# Patient Record
Sex: MALE | Race: WHITE
[De-identification: names, ages, dates, MRNs, and addresses within clinical notes are randomized per-mention and may not be internally consistent; named-entity substitution may affect disease eponyms.]

---

## 2017-02-01 ENCOUNTER — HOSPITAL ENCOUNTER (EMERGENCY)
Dept: HOSPITAL 47 - EC | Age: 27
Discharge: HOME | End: 2017-02-01
Payer: COMMERCIAL

## 2017-02-01 VITALS — TEMPERATURE: 98.6 F | DIASTOLIC BLOOD PRESSURE: 70 MMHG | HEART RATE: 91 BPM | SYSTOLIC BLOOD PRESSURE: 136 MMHG

## 2017-02-01 VITALS — RESPIRATION RATE: 18 BRPM

## 2017-02-01 DIAGNOSIS — M94.0: Primary | ICD-10-CM

## 2017-02-01 DIAGNOSIS — F32.9: ICD-10-CM

## 2017-02-01 DIAGNOSIS — Z88.0: ICD-10-CM

## 2017-02-01 DIAGNOSIS — Z79.899: ICD-10-CM

## 2017-02-01 LAB
ALP SERPL-CCNC: 91 U/L (ref 38–126)
ALT SERPL-CCNC: 39 U/L (ref 21–72)
AMYLASE SERPL-CCNC: 88 U/L (ref 30–110)
ANION GAP SERPL CALC-SCNC: 11 MMOL/L
AST SERPL-CCNC: 25 U/L (ref 17–59)
BASOPHILS # BLD AUTO: 0.1 K/UL (ref 0–0.2)
BASOPHILS NFR BLD AUTO: 1 %
BUN SERPL-SCNC: 9 MG/DL (ref 9–20)
CALCIUM SPEC-MCNC: 9.6 MG/DL (ref 8.4–10.2)
CH: 30.8
CHCM: 34.9
CHLORIDE SERPL-SCNC: 104 MMOL/L (ref 98–107)
CO2 SERPL-SCNC: 24 MMOL/L (ref 22–30)
EOSINOPHIL # BLD AUTO: 0.1 K/UL (ref 0–0.7)
EOSINOPHIL NFR BLD AUTO: 2 %
ERYTHROCYTE [DISTWIDTH] IN BLOOD BY AUTOMATED COUNT: 5.42 M/UL (ref 4.3–5.9)
ERYTHROCYTE [DISTWIDTH] IN BLOOD: 13 % (ref 11.5–15.5)
GLUCOSE SERPL-MCNC: 112 MG/DL (ref 74–99)
HCT VFR BLD AUTO: 47.9 % (ref 39–53)
HDW: 2.52
HGB BLD-MCNC: 16.3 GM/DL (ref 13–17.5)
LUC NFR BLD AUTO: 1 %
LYMPHOCYTES # SPEC AUTO: 1.6 K/UL (ref 1–4.8)
LYMPHOCYTES NFR SPEC AUTO: 25 %
MCH RBC QN AUTO: 30.2 PG (ref 25–35)
MCHC RBC AUTO-ENTMCNC: 34.1 G/DL (ref 31–37)
MCV RBC AUTO: 88.4 FL (ref 80–100)
MONOCYTES # BLD AUTO: 0.3 K/UL (ref 0–1)
MONOCYTES NFR BLD AUTO: 5 %
NEUTROPHILS # BLD AUTO: 4.3 K/UL (ref 1.3–7.7)
NEUTROPHILS NFR BLD AUTO: 66 %
NON-AFRICAN AMERICAN GFR(MDRD): >60
PARTICLE COUNT: 1971
PH UR: 6.5 [PH] (ref 5–8)
POTASSIUM SERPL-SCNC: 4.3 MMOL/L (ref 3.5–5.1)
PROT SERPL-MCNC: 7.2 G/DL (ref 6.3–8.2)
RBC UR QL: <1 /HPF (ref 0–5)
SODIUM SERPL-SCNC: 139 MMOL/L (ref 137–145)
SP GR UR: 1.01 (ref 1–1.03)
UA BILLING (MACRO VS. MICRO): (no result)
UROBILINOGEN UR QL STRIP: 2 MG/DL (ref ?–2)
WBC # BLD AUTO: 0.09 10*3/UL
WBC # BLD AUTO: 6.5 K/UL (ref 3.8–10.6)
WBC #/AREA URNS HPF: 1 /HPF (ref 0–5)
WBC (PEROX): 6.61

## 2017-02-01 PROCEDURE — 86308 HETEROPHILE ANTIBODY SCREEN: CPT

## 2017-02-01 PROCEDURE — 36415 COLL VENOUS BLD VENIPUNCTURE: CPT

## 2017-02-01 PROCEDURE — 83690 ASSAY OF LIPASE: CPT

## 2017-02-01 PROCEDURE — 99284 EMERGENCY DEPT VISIT MOD MDM: CPT

## 2017-02-01 PROCEDURE — 74000: CPT

## 2017-02-01 PROCEDURE — 71020: CPT

## 2017-02-01 PROCEDURE — 82150 ASSAY OF AMYLASE: CPT

## 2017-02-01 PROCEDURE — 80053 COMPREHEN METABOLIC PANEL: CPT

## 2017-02-01 PROCEDURE — 81001 URINALYSIS AUTO W/SCOPE: CPT

## 2017-02-01 PROCEDURE — 85025 COMPLETE CBC W/AUTO DIFF WBC: CPT

## 2017-02-01 NOTE — XR
EXAMINATION TYPE: XR KUB

 

DATE OF EXAM: 2/1/2017 10:00 PM

 

COMPARISON: NONE

 

HISTORY: Left-sided chest pain and abdominal pain

 

TECHNIQUE: 2 views

 

FINDINGS: Bowel gas pattern is normal. There is no sign of intestinal obstruction or pneumoperitoneum
. Fecal pattern is normal. Lung bases are clear. There are no pathologic calcifications over the kidn
eys.

 

IMPRESSION: Nonacute abdomen.

## 2017-02-01 NOTE — XR
EXAMINATION TYPE: XR chest 2V

 

DATE OF EXAM: 2/1/2017 9:59 PM

 

COMPARISON: NONE

 

HISTORY: Chest pain

 

TECHNIQUE:  Frontal and lateral views of the chest are obtained.

 

FINDINGS:  Heart and mediastinum are normal. Lungs are clear. Diaphragm is normal. Bony thorax and so
ft tissues appear normal.

 

IMPRESSION:  Normal chest

## 2017-02-01 NOTE — ED
Abdominal Pain HPI





- General


Chief Complaint: Abdominal Pain


Stated Complaint: rib pain


Time Seen by Provider: 02/01/17 21:30


Source: patient, RN notes reviewed


Mode of arrival: ambulatory


Limitations: no limitations





- History of Present Illness


Initial Comments: 





26-year-old male presents emergency Department chief complaint left-sided chest

, abdominal pain.  Patient states that it started when he woke this morning.  

Patient states nothing makes it feel better or worse.  Patient denies nausea, 

vomiting, diarrhea constipation.  Patient denies any chest pain or shortness of 

breath.  Patient states she had no trauma denies any rashes area.  Patient has 

not taken any Tylenol Motrin today.





- Related Data


 Home Medications











 Medication  Instructions  Recorded  Confirmed


 


Buspar(Unknown) 1 tab PO QAM 02/01/17 02/01/17


 


Lamictal(Unknown) 1 tab PO QAM 02/01/17 02/01/17


 


Prozac(Unknown) 1 cap PO QAM 02/01/17 02/01/17











 Allergies











Allergy/AdvReac Type Severity Reaction Status Date / Time


 


Penicillins Allergy  Rash/Hives Verified 02/01/17 21:35














Review of Systems


ROS Statement: 


Those systems with pertinent positive or pertinent negative responses have been 

documented in the HPI.





ROS Other: All systems not noted in ROS Statement are negative.





Past Medical History


Past Medical History: No Reported History


History of Any Multi-Drug Resistant Organisms: None Reported


Additional Past Surgical History / Comment(s): right acl, right knee arthroscopy

, right meniscus tear, right knee I&D


Past Psychological History: Depression


Smoking Status: Never smoker


Past Alcohol Use History: Occasional


Past Drug Use History: None Reported





General Exam


Limitations: no limitations


General appearance: alert, in no apparent distress


Head exam: Present: atraumatic, normocephalic, normal inspection


ENT exam: Present: normal exam, normal oropharynx, mucous membranes moist


Neck exam: Present: normal inspection, full ROM.  Absent: tenderness, 

meningismus, lymphadenopathy


Respiratory exam: Present: normal lung sounds bilaterally, chest wall 

tenderness (Minimal tenderness left lower ribs).  Absent: respiratory distress, 

wheezes, rales, rhonchi, stridor


Cardiovascular Exam: Present: regular rate, normal rhythm, normal heart sounds.

  Absent: systolic murmur, diastolic murmur, rubs, gallop, clicks


GI/Abdominal exam: Present: soft, normal bowel sounds, other (No splenomegaly 

appreciated).  Absent: distended, tenderness, guarding, rebound, rigid


Back exam: Absent: CVA tenderness (R), CVA tenderness (L)


Neurological exam: Present: alert, oriented X3, CN II-XII intact


Skin exam: Present: warm, dry, intact, normal color.  Absent: rash





Course


 Vital Signs











  02/01/17





  21:27


 


Temperature 98.1 F


 


Pulse Rate 70


 


Respiratory 18





Rate 


 


Blood Pressure 137/63


 


O2 Sat by Pulse 98





Oximetry 














Medical Decision Making





- Medical Decision Making





26-year-old male present emergency department for left rib/abdominal pain.  

Patient had no trauma rashes.  Patient lab work, x-rays within normal limits.  

Pain is reproducible in between the past.  This may be consistent with 

costochondritis.  Patient will follow-up with primary care physician.  We 

discussed taking ibuprofen for the pain





- Lab Data


Result diagrams: 


 02/01/17 21:55





 02/01/17 21:55


 Lab Results











  02/01/17 02/01/17 02/01/17 Range/Units





  21:55 21:55 21:55 


 


WBC   6.5   (3.8-10.6)  k/uL


 


RBC   5.42   (4.30-5.90)  m/uL


 


Hgb   16.3   (13.0-17.5)  gm/dL


 


Hct   47.9   (39.0-53.0)  %


 


MCV   88.4   (80.0-100.0)  fL


 


MCH   30.2   (25.0-35.0)  pg


 


MCHC   34.1   (31.0-37.0)  g/dL


 


RDW   13.0   (11.5-15.5)  %


 


Plt Count   231   (150-450)  k/uL


 


Neutrophils %   66   %


 


Lymphocytes %   25   %


 


Monocytes %   5   %


 


Eosinophils %   2   %


 


Basophils %   1   %


 


Neutrophils #   4.3   (1.3-7.7)  k/uL


 


Lymphocytes #   1.6   (1.0-4.8)  k/uL


 


Monocytes #   0.3   (0-1.0)  k/uL


 


Eosinophils #   0.1   (0-0.7)  k/uL


 


Basophils #   0.1   (0-0.2)  k/uL


 


Sodium  139    (137-145)  mmol/L


 


Potassium  4.3    (3.5-5.1)  mmol/L


 


Chloride  104    ()  mmol/L


 


Carbon Dioxide  24    (22-30)  mmol/L


 


Anion Gap  11    mmol/L


 


BUN  9    (9-20)  mg/dL


 


Creatinine  1.00    (0.66-1.25)  mg/dL


 


Est GFR (MDRD) Af Amer  >60    (>60 ml/min/1.73 sqM)  


 


Est GFR (MDRD) Non-Af  >60    (>60 ml/min/1.73 sqM)  


 


Glucose  112 H    (74-99)  mg/dL


 


Calcium  9.6    (8.4-10.2)  mg/dL


 


Total Bilirubin  0.8    (0.2-1.3)  mg/dL


 


AST  25    (17-59)  U/L


 


ALT  39    (21-72)  U/L


 


Alkaline Phosphatase  91    ()  U/L


 


Total Protein  7.2    (6.3-8.2)  g/dL


 


Albumin  4.4    (3.5-5.0)  g/dL


 


Amylase  88    ()  U/L


 


Lipase  84    ()  U/L


 


Urine Color     


 


Urine Appearance     (Clear)  


 


Urine pH     (5.0-8.0)  


 


Ur Specific Gravity     (1.001-1.035)  


 


Urine Protein     (Negative)  


 


Urine Glucose (UA)     (Negative)  


 


Urine Ketones     (Negative)  


 


Urine Blood     (Negative)  


 


Urine Nitrate     (Negative)  


 


Urine Bilirubin     (Negative)  


 


Urine Urobilinogen     (<2.0)  mg/dL


 


Ur Leukocyte Esterase     (Negative)  


 


Urine RBC     (0-5)  /hpf


 


Urine WBC     (0-5)  /hpf


 


Urine Mucus     (None)  /hpf


 


Heterophile Antibody    Negative  (Negative)  














  02/01/17 Range/Units





  22:25 


 


WBC   (3.8-10.6)  k/uL


 


RBC   (4.30-5.90)  m/uL


 


Hgb   (13.0-17.5)  gm/dL


 


Hct   (39.0-53.0)  %


 


MCV   (80.0-100.0)  fL


 


MCH   (25.0-35.0)  pg


 


MCHC   (31.0-37.0)  g/dL


 


RDW   (11.5-15.5)  %


 


Plt Count   (150-450)  k/uL


 


Neutrophils %   %


 


Lymphocytes %   %


 


Monocytes %   %


 


Eosinophils %   %


 


Basophils %   %


 


Neutrophils #   (1.3-7.7)  k/uL


 


Lymphocytes #   (1.0-4.8)  k/uL


 


Monocytes #   (0-1.0)  k/uL


 


Eosinophils #   (0-0.7)  k/uL


 


Basophils #   (0-0.2)  k/uL


 


Sodium   (137-145)  mmol/L


 


Potassium   (3.5-5.1)  mmol/L


 


Chloride   ()  mmol/L


 


Carbon Dioxide   (22-30)  mmol/L


 


Anion Gap   mmol/L


 


BUN   (9-20)  mg/dL


 


Creatinine   (0.66-1.25)  mg/dL


 


Est GFR (MDRD) Af Amer   (>60 ml/min/1.73 sqM)  


 


Est GFR (MDRD) Non-Af   (>60 ml/min/1.73 sqM)  


 


Glucose   (74-99)  mg/dL


 


Calcium   (8.4-10.2)  mg/dL


 


Total Bilirubin   (0.2-1.3)  mg/dL


 


AST   (17-59)  U/L


 


ALT   (21-72)  U/L


 


Alkaline Phosphatase   ()  U/L


 


Total Protein   (6.3-8.2)  g/dL


 


Albumin   (3.5-5.0)  g/dL


 


Amylase   ()  U/L


 


Lipase   ()  U/L


 


Urine Color  Yellow  


 


Urine Appearance  Clear  (Clear)  


 


Urine pH  6.5  (5.0-8.0)  


 


Ur Specific Gravity  1.011  (1.001-1.035)  


 


Urine Protein  Negative  (Negative)  


 


Urine Glucose (UA)  Negative  (Negative)  


 


Urine Ketones  Negative  (Negative)  


 


Urine Blood  Negative  (Negative)  


 


Urine Nitrate  Negative  (Negative)  


 


Urine Bilirubin  Negative  (Negative)  


 


Urine Urobilinogen  2.0  (<2.0)  mg/dL


 


Ur Leukocyte Esterase  Trace H  (Negative)  


 


Urine RBC  <1  (0-5)  /hpf


 


Urine WBC  1  (0-5)  /hpf


 


Urine Mucus  Rare H  (None)  /hpf


 


Heterophile Antibody   (Negative)  














Disposition


Clinical Impression: 


 Acute costochondritis





Disposition: HOME SELF-CARE


Condition: Stable


Instructions:  Costochondritis (ED)


Additional Instructions: 


Please return to the Emergency Department if symptoms worsen or any other 

concerns.


Time of Disposition: 23:06

## 2017-02-08 ENCOUNTER — HOSPITAL ENCOUNTER (EMERGENCY)
Dept: HOSPITAL 47 - EC | Age: 27
Discharge: HOME | End: 2017-02-08
Payer: COMMERCIAL

## 2017-02-08 VITALS — SYSTOLIC BLOOD PRESSURE: 141 MMHG | HEART RATE: 77 BPM | DIASTOLIC BLOOD PRESSURE: 62 MMHG

## 2017-02-08 VITALS — TEMPERATURE: 98.1 F

## 2017-02-08 VITALS — RESPIRATION RATE: 16 BRPM

## 2017-02-08 DIAGNOSIS — Z79.899: ICD-10-CM

## 2017-02-08 DIAGNOSIS — F41.8: ICD-10-CM

## 2017-02-08 DIAGNOSIS — R10.13: ICD-10-CM

## 2017-02-08 DIAGNOSIS — D72.829: ICD-10-CM

## 2017-02-08 DIAGNOSIS — K92.0: Primary | ICD-10-CM

## 2017-02-08 DIAGNOSIS — Z88.0: ICD-10-CM

## 2017-02-08 LAB
ALP SERPL-CCNC: 105 U/L (ref 38–126)
ALT SERPL-CCNC: 32 U/L (ref 21–72)
AMYLASE SERPL-CCNC: 83 U/L (ref 30–110)
ANION GAP SERPL CALC-SCNC: 17 MMOL/L
AST SERPL-CCNC: 26 U/L (ref 17–59)
BASOPHILS # BLD AUTO: 0.1 K/UL (ref 0–0.2)
BASOPHILS NFR BLD AUTO: 0 %
BUN SERPL-SCNC: 10 MG/DL (ref 9–20)
CALCIUM SPEC-MCNC: 10 MG/DL (ref 8.4–10.2)
CH: 30.8
CHCM: 35.2
CHLORIDE SERPL-SCNC: 105 MMOL/L (ref 98–107)
CO2 SERPL-SCNC: 22 MMOL/L (ref 22–30)
EOSINOPHIL # BLD AUTO: 0 K/UL (ref 0–0.7)
EOSINOPHIL NFR BLD AUTO: 0 %
ERYTHROCYTE [DISTWIDTH] IN BLOOD BY AUTOMATED COUNT: 5.39 M/UL (ref 4.3–5.9)
ERYTHROCYTE [DISTWIDTH] IN BLOOD: 13 % (ref 11.5–15.5)
GLUCOSE SERPL-MCNC: 126 MG/DL (ref 74–99)
HCT VFR BLD AUTO: 47.4 % (ref 39–53)
HDW: 2.49
HGB BLD-MCNC: 15.8 GM/DL (ref 13–17.5)
KETONES UR QL STRIP.AUTO: (no result)
LUC NFR BLD AUTO: 0 %
LYMPHOCYTES # SPEC AUTO: 1 K/UL (ref 1–4.8)
LYMPHOCYTES NFR SPEC AUTO: 6 %
MCH RBC QN AUTO: 29.3 PG (ref 25–35)
MCHC RBC AUTO-ENTMCNC: 33.3 G/DL (ref 31–37)
MCV RBC AUTO: 87.9 FL (ref 80–100)
MONOCYTES # BLD AUTO: 0.3 K/UL (ref 0–1)
MONOCYTES NFR BLD AUTO: 2 %
NEUTROPHILS # BLD AUTO: 13.5 K/UL (ref 1.3–7.7)
NEUTROPHILS NFR BLD AUTO: 91 %
NON-AFRICAN AMERICAN GFR(MDRD): >60
PARTICLE COUNT: 2627
PH UR: 8 [PH] (ref 5–8)
POTASSIUM SERPL-SCNC: 4.3 MMOL/L (ref 3.5–5.1)
PROT SERPL-MCNC: 7.4 G/DL (ref 6.3–8.2)
PROT UR QL: (no result)
RBC UR QL: 1 /HPF (ref 0–5)
SODIUM SERPL-SCNC: 144 MMOL/L (ref 137–145)
SP GR UR: 1.03 (ref 1–1.03)
SQUAMOUS UR QL AUTO: <1 /HPF (ref 0–4)
UA BILLING (MACRO VS. MICRO): (no result)
UROBILINOGEN UR QL STRIP: <2 MG/DL (ref ?–2)
WBC # BLD AUTO: 0.04 10*3/UL
WBC # BLD AUTO: 14.8 K/UL (ref 3.8–10.6)
WBC #/AREA URNS HPF: 4 /HPF (ref 0–5)
WBC (PEROX): 15.46

## 2017-02-08 PROCEDURE — 99284 EMERGENCY DEPT VISIT MOD MDM: CPT

## 2017-02-08 PROCEDURE — 74000: CPT

## 2017-02-08 PROCEDURE — 96375 TX/PRO/DX INJ NEW DRUG ADDON: CPT

## 2017-02-08 PROCEDURE — 80053 COMPREHEN METABOLIC PANEL: CPT

## 2017-02-08 PROCEDURE — 96361 HYDRATE IV INFUSION ADD-ON: CPT

## 2017-02-08 PROCEDURE — 81001 URINALYSIS AUTO W/SCOPE: CPT

## 2017-02-08 PROCEDURE — 83690 ASSAY OF LIPASE: CPT

## 2017-02-08 PROCEDURE — 96376 TX/PRO/DX INJ SAME DRUG ADON: CPT

## 2017-02-08 PROCEDURE — 85025 COMPLETE CBC W/AUTO DIFF WBC: CPT

## 2017-02-08 PROCEDURE — 74177 CT ABD & PELVIS W/CONTRAST: CPT

## 2017-02-08 PROCEDURE — 36415 COLL VENOUS BLD VENIPUNCTURE: CPT

## 2017-02-08 PROCEDURE — 82150 ASSAY OF AMYLASE: CPT

## 2017-02-08 PROCEDURE — 96374 THER/PROPH/DIAG INJ IV PUSH: CPT

## 2017-02-08 NOTE — ED
Nausea/Vomiting/Diarrhea HPI





- General


Chief complaint: Nausea/Vomiting/Diarrhea


Stated complaint: Vomiting Blood


Time Seen by Provider: 02/08/17 02:02


Source: patient, RN notes reviewed


Mode of arrival: ambulatory


Limitations: no limitations





- History of Present Illness


Initial comments: 





Patient is a 26 year old male with chief complaint of vomiting for 2 hours, and 

patient states he has been vomiting blood. Patient states this has never 

happened before, denies history of reflx disease. Patient reports that he has 

epigastric abdominal pain. Patient reports it is dark brown blood. Patient 

states has no other symptoms including diarrhea, blood stools, or fever or 

chills. Patient denies cough and rib pain. 





- Related Data


 Home Medications











 Medication  Instructions  Recorded  Confirmed


 


Buspar(Unknown) 1 tab PO QAM 02/01/17 02/01/17


 


Lamictal(Unknown) 1 tab PO QAM 02/01/17 02/01/17


 


Prozac(Unknown) 1 cap PO QAM 02/01/17 02/01/17








 Previous Rx's











 Medication  Instructions  Recorded


 


Omeprazole 40 mg PO DAILY #20 capsule. 02/08/17


 


Ondansetron Odt [Zofran Odt] 4 mg PO Q8HR PRN #12 tab 02/08/17











 Allergies











Allergy/AdvReac Type Severity Reaction Status Date / Time


 


Penicillins Allergy  Rash/Hives Verified 02/08/17 01:30














Review of Systems


ROS Statement: 


Those systems with pertinent positive or pertinent negative responses have been 

documented in the HPI.





ROS Other: All systems not noted in ROS Statement are negative.





Past Medical History


Past Medical History: No Reported History


History of Any Multi-Drug Resistant Organisms: None Reported


Additional Past Surgical History / Comment(s): right acl, right knee arthroscopy

, right meniscus tear, right knee I&D


Past Psychological History: Anxiety, Depression


Smoking Status: Never smoker


Past Alcohol Use History: Occasional


Past Drug Use History: None Reported





General Exam





- General Exam Comments


Initial Comments: 





Well appearing 26 year old male, no acute distress. 


Limitations: no limitations


General appearance: alert, in no apparent distress


Head exam: Present: atraumatic, normocephalic, normal inspection


Eye exam: Present: normal appearance, PERRL, EOMI.  Absent: scleral icterus, 

conjunctival injection, periorbital swelling


ENT exam: Present: normal exam, mucous membranes moist


Neck exam: Present: normal inspection.  Absent: tenderness, meningismus, 

lymphadenopathy


Respiratory exam: Present: normal lung sounds bilaterally.  Absent: respiratory 

distress, wheezes, rales, rhonchi, stridor


Cardiovascular Exam: Present: regular rate, normal rhythm, normal heart sounds.

  Absent: systolic murmur, diastolic murmur, rubs, gallop, clicks


GI/Abdominal exam: Present: soft, tenderness (epigastric tenderness), normal 

bowel sounds.  Absent: distended, guarding, rebound, rigid


Extremities exam: Present: normal inspection, full ROM, normal capillary 

refill.  Absent: tenderness, pedal edema, joint swelling, calf tenderness


Back exam: Present: normal inspection


Neurological exam: Present: alert, oriented X3, CN II-XII intact


Psychiatric exam: Present: normal affect, normal mood


Skin exam: Present: warm, dry, intact, normal color.  Absent: rash





Course


 Vital Signs











  02/08/17 02/08/17 02/08/17





  01:28 02:30 04:36


 


Temperature 98.1 F  


 


Pulse Rate 89 79 77


 


Respiratory 18 16 16





Rate   


 


Blood Pressure 129/76 138/69 141/62


 


O2 Sat by Pulse 98 97 98





Oximetry   














Medical Decision Making





- Medical Decision Making





Patientis a 26 year old with 2 hours of bloody emesis. Emesis is dark coffee 

ground like. Patient reports this has never happened before. Patient has 

leukocytosis and continue dnausea despite inital dose of zofran. Patient given 

IV protonix, morphine, and reglan and zofran. Patient reports pain is 

diminshed. Ct is negative for any acute process. Patient will discharged with 

Omeprazole and advised to follow up with GI specialist. REturn parameters 

discussed. Patient also given Rx for nausea medicaiotn and note for work. 





- Lab Data


Result diagrams: 


 02/08/17 01:40





 02/08/17 01:40


 Lab Results











  02/08/17 02/08/17 02/08/17 Range/Units





  01:40 01:40 03:20 


 


WBC   14.8 H   (3.8-10.6)  k/uL


 


RBC   5.39   (4.30-5.90)  m/uL


 


Hgb   15.8   (13.0-17.5)  gm/dL


 


Hct   47.4   (39.0-53.0)  %


 


MCV   87.9   (80.0-100.0)  fL


 


MCH   29.3   (25.0-35.0)  pg


 


MCHC   33.3   (31.0-37.0)  g/dL


 


RDW   13.0   (11.5-15.5)  %


 


Plt Count   256   (150-450)  k/uL


 


Neutrophils %   91   %


 


Lymphocytes %   6   %


 


Monocytes %   2   %


 


Eosinophils %   0   %


 


Basophils %   0   %


 


Neutrophils #   13.5 H   (1.3-7.7)  k/uL


 


Lymphocytes #   1.0   (1.0-4.8)  k/uL


 


Monocytes #   0.3   (0-1.0)  k/uL


 


Eosinophils #   0.0   (0-0.7)  k/uL


 


Basophils #   0.1   (0-0.2)  k/uL


 


Sodium  144    (137-145)  mmol/L


 


Potassium  4.3    (3.5-5.1)  mmol/L


 


Chloride  105    ()  mmol/L


 


Carbon Dioxide  22    (22-30)  mmol/L


 


Anion Gap  17    mmol/L


 


BUN  10    (9-20)  mg/dL


 


Creatinine  1.00    (0.66-1.25)  mg/dL


 


Est GFR (MDRD) Af Amer  >60    (>60 ml/min/1.73 sqM)  


 


Est GFR (MDRD) Non-Af  >60    (>60 ml/min/1.73 sqM)  


 


Glucose  126 H    (74-99)  mg/dL


 


Calcium  10.0    (8.4-10.2)  mg/dL


 


Total Bilirubin  0.4    (0.2-1.3)  mg/dL


 


AST  26    (17-59)  U/L


 


ALT  32    (21-72)  U/L


 


Alkaline Phosphatase  105    ()  U/L


 


Total Protein  7.4    (6.3-8.2)  g/dL


 


Albumin  4.6    (3.5-5.0)  g/dL


 


Amylase  83    ()  U/L


 


Lipase  70    ()  U/L


 


Urine Color    Yellow  


 


Urine Appearance    Clear  (Clear)  


 


Urine pH    8.0  (5.0-8.0)  


 


Ur Specific Gravity    1.026  (1.001-1.035)  


 


Urine Protein    1+ H  (Negative)  


 


Urine Glucose (UA)    Negative  (Negative)  


 


Urine Ketones    1+ H  (Negative)  


 


Urine Blood    Negative  (Negative)  


 


Urine Nitrate    Negative  (Negative)  


 


Urine Bilirubin    Negative  (Negative)  


 


Urine Urobilinogen    <2.0  (<2.0)  mg/dL


 


Ur Leukocyte Esterase    Large H  (Negative)  


 


Urine RBC    1  (0-5)  /hpf


 


Urine WBC    4  (0-5)  /hpf


 


Ur Squamous Epith Cells    <1  (0-4)  /hpf


 


Urine Bacteria    Occasional H  (None)  /hpf


 


Urine Mucus    Rare H  (None)  /hpf














- Radiology Data


Radiology results: report reviewed


Negative CT abdomen and pelvis. 





Disposition


Clinical Impression: 


 Bloody emesis, Vomiting





Disposition: HOME SELF-CARE


Condition: Good


Instructions:  Acute Nausea and Vomiting (ED)


Additional Instructions: 


Patient advised to follow-up with GI specialist in 2 days.  Continue nausea 

medication and take omeprazole once a day for the next month.  Return to the [facility] 

if any alarming signs or symptoms occur.  Follow-up with primary care provider 

within the next week.


Prescriptions: 


Omeprazole 40 mg PO DAILY #20 capsule.


Ondansetron Odt [Zofran Odt] 4 mg PO Q8HR PRN #12 tab


 PRN Reason: Nausea


Referrals: 


Cesario Hogan MD [Primary Care Provider] - 1-2 days


Juan Samuels MD [STAFF PHYSICIAN] - 1-2 days


Time of Disposition: 04:36

## 2017-02-08 NOTE — XR
EXAMINATION TYPE: XR KUB

 

DATE OF EXAM: 2/8/2017 2:16 AM

 

COMPARISON: To 117

 

HISTORY: Nausea and vomiting

 

TECHNIQUE: 2 views

 

FINDINGS: Bowel gas pattern is normal. There is no sign of intestinal obstruction or pneumoperitoneum
. Fecal pattern is normal. Lung bases are clear. There are no pathologic calcifications. Bony structu
res appear intact.

 

IMPRESSION: Nonacute abdomen. No change.

## 2017-02-08 NOTE — CT
EXAMINATION TYPE: CT abdomen pelvis w con

 

DATE OF EXAM: 2/8/2017 4:07 AM

 

COMPARISON: NONE

 

HISTORY: N/V/D

 

CT DLP: 1437 mGycm

Automated exposure control for dose reduction was used.

 

TECHNIQUE:  Helical acquisition of images was performed from the lung bases through the pelvis.

 

CONTRAST: 

Performed without Oral Contrast and with IV Contrast, patient injected with 100 mL of Omnipaque 300.

 

FINDINGS: 

 

Lung bases are clear. There is no pleural effusion. Heart size is normal.

 

Liver spleen pancreas gallbladder appear normal. Bile ducts are nondilated. There is no adrenal mass.
 Kidneys show satisfactory contrast opacification. There is no hydronephrosis. There is no retroperit
todd adenopathy. There is no ascites. I see no intestinal wall thickening. There are no dilated loop
s. Bladder distends smoothly. There is no sign of a pelvic mass. Appendix is not seen. There is no si
gn of appendicitis.

IMPRESSION: 

NEGATIVE CT SCAN OF THE ABDOMEN AND PELVIS.

## 2017-04-22 ENCOUNTER — HOSPITAL ENCOUNTER (EMERGENCY)
Dept: HOSPITAL 47 - EC | Age: 27
LOS: 1 days | Discharge: HOME | End: 2017-04-23
Payer: COMMERCIAL

## 2017-04-22 DIAGNOSIS — Z88.0: ICD-10-CM

## 2017-04-22 DIAGNOSIS — F43.20: Primary | ICD-10-CM

## 2017-04-22 DIAGNOSIS — Z79.899: ICD-10-CM

## 2017-04-22 DIAGNOSIS — F41.9: ICD-10-CM

## 2017-04-22 DIAGNOSIS — F32.9: ICD-10-CM

## 2017-04-22 PROCEDURE — 80306 DRUG TEST PRSMV INSTRMNT: CPT

## 2017-04-22 PROCEDURE — 99284 EMERGENCY DEPT VISIT MOD MDM: CPT

## 2017-04-22 PROCEDURE — 82075 ASSAY OF BREATH ETHANOL: CPT

## 2017-04-22 NOTE — ED
General Adult HPI





- General


Chief complaint: Psychiatric Symptoms


Stated complaint: Mental Health-[employer]


Time Seen by Provider: 04/22/17 22:35


Source: patient, police, RN notes reviewed


Mode of arrival: ambulatory


Limitations: no limitations





- History of Present Illness


Initial comments: 





This is a 26-year-old male who presents to the emergency department stating 

that he is brought here because he made some Facebook messages stating he was 

suicidal.  The  found out somehow and went and talked with him and 

brought him into the emergency department.  Patient states he is not truly 

suicidal but he does admit that he wrote that he was suicidal both on Facebook 

and had a text messaged somewhat.  Patient states he did attempt suicide 3 

years ago but he states he has nowhere near that point now.  Patient states he 

is under quite a bit of stress because his had a new job is working really 

hard.  Patient denies any physical complaints today.  Patient denies a fever or 

chills.  Patient denies headache patient denies numbness weakness.  Patient 

denies chest pain palpitations difficulty breathing or shortness of breath.  

Patient denies abdominal pain patient denies nausea vomiting or diarrhea.  

Patient denies suicidal or homicidal ideations.  Patient denies any 

hallucinations or delusions.





- Related Data


 Home Medications











 Medication  Instructions  Recorded  Confirmed


 


Buspar(Unknown) 1 tab PO QAM 02/01/17 04/22/17


 


Lamictal(Unknown) 1 tab PO QAM 02/01/17 04/22/17


 


Prozac(Unknown) 1 cap PO QAM 02/01/17 04/22/17








 Previous Rx's











 Medication  Instructions  Recorded


 


Omeprazole 40 mg PO DAILY #20 capsule. 02/08/17











 Allergies











Allergy/AdvReac Type Severity Reaction Status Date / Time


 


Penicillins Allergy  Rash/Hives Verified 04/22/17 22:34














Review of Systems


ROS Statement: 


Those systems with pertinent positive or pertinent negative responses have been 

documented in the HPI.





ROS Other: All systems not noted in ROS Statement are negative.





Past Medical History


Past Medical History: No Reported History


History of Any Multi-Drug Resistant Organisms: None Reported


Additional Past Surgical History / Comment(s): right acl, right knee arthroscopy

, right meniscus tear, right knee I&D


Past Psychological History: Anxiety, Depression


Smoking Status: Never smoker


Past Alcohol Use History: Occasional


Past Drug Use History: None Reported





General Exam





- General Exam Comments


Initial Comments: 





GENERAL:


Patient is well-developed and well-nourished.  Patient is nontoxic and well-

hydrated and is in no acute distress.





ENT:


Neck is soft and supple.  No significant lymphadenopathy is noted.  Oropharynx 

is clear.  Moist mucous membranes.  Neck has full range of motion without 

eliciting any pain. 





EYES:


The sclera were anicteric and conjunctiva were pink and moist.  Extraocular 

movements were intact and pupils were equal round and reactive to light.  

Eyelids were unremarkable.





PULMONARY:


Unlabored respirations.  Good breath sounds bilaterally.  No audible rales 

rhonchi or wheezing was noted.





CARDIOVASCULAR:


There is a regular rate and rhythm without any murmurs gallops or rubs. 





ABDOMEN:


Soft and nontender with normal bowel sounds.  No palpable organomegaly was 

noted.  There is no palpable pulsatile mass.





SKIN:


Skin is clear with no lesions or rashes and otherwise unremarkable.





NEUROLOGIC:


Patient is alert and oriented x3.  Cranial nerves II through XII are grossly 

intact.  Motor and sensory are also intact.  Normal speech, volume and content.

  Symmetrical smile.  





MUSCULOSKELETAL:


Normal extremities with adequate strength and full range of motion.  No lower 

extremity swelling or edema.  No calf tenderness.





LYMPHATICS:


No significant lymphadenopathy is noted





PSYCHIATRIC:


Patient denies suicidal or homicidal ideations.  Patient is mildly anxious


Limitations: no limitations





Course


 Vital Signs











  04/22/17





  22:35


 


Temperature 98.2 F


 


Pulse Rate 100


 


Respiratory 20





Rate 


 


Blood Pressure 158/85


 


O2 Sat by Pulse 100





Oximetry 














Medical Decision Making





- Medical Decision Making





EPS evaluated the patient spoke with the patient's psychiatrist and they 

thought the patient would be able to go home and be safe.  Mother was contacted 

mother agrees mother will come and  the patient.





- Lab Data


 Lab Results











  04/22/17 Range/Units





  23:02 


 


Urine Opiates Screen  Not Detected  (NotDetected)  


 


Ur Oxycodone Screen  Not Detected  (NotDetected)  


 


Urine Methadone Screen  Not Detected  (NotDetected)  


 


Ur Propoxyphene Screen  Not Detected  (NotDetected)  


 


Ur Barbiturates Screen  Not Detected  (NotDetected)  


 


U Tricyclic Antidepress  Not Detected  (NotDetected)  


 


Ur Phencyclidine Scrn  Not Detected  (NotDetected)  


 


Ur Amphetamines Screen  Not Detected  (NotDetected)  


 


U Methamphetamines Scrn  Not Detected  (NotDetected)  


 


U Benzodiazepines Scrn  Detected H  (NotDetected)  


 


Urine Cocaine Screen  Not Detected  (NotDetected)  


 


U Marijuana (THC) Screen  Not Detected  (NotDetected)  














Disposition


Clinical Impression: 


 Adjustment reaction





Disposition: HOME SELF-CARE


Condition: Good


Instructions:  Stress (ED)


Referrals: 


Cesario Hogan MD [Primary Care Provider] - 1-2 days


Time of Disposition: 00:54

## 2017-04-23 VITALS
SYSTOLIC BLOOD PRESSURE: 152 MMHG | TEMPERATURE: 97.6 F | HEART RATE: 91 BPM | RESPIRATION RATE: 18 BRPM | DIASTOLIC BLOOD PRESSURE: 87 MMHG

## 2018-01-11 ENCOUNTER — HOSPITAL ENCOUNTER (EMERGENCY)
Dept: HOSPITAL 47 - EC | Age: 28
Discharge: HOME | End: 2018-01-11
Payer: COMMERCIAL

## 2018-01-11 VITALS
DIASTOLIC BLOOD PRESSURE: 68 MMHG | SYSTOLIC BLOOD PRESSURE: 131 MMHG | HEART RATE: 88 BPM | RESPIRATION RATE: 18 BRPM | TEMPERATURE: 98.3 F

## 2018-01-11 DIAGNOSIS — Z88.0: ICD-10-CM

## 2018-01-11 DIAGNOSIS — Z79.899: ICD-10-CM

## 2018-01-11 DIAGNOSIS — S06.0X0A: Primary | ICD-10-CM

## 2018-01-11 DIAGNOSIS — F32.9: ICD-10-CM

## 2018-01-11 DIAGNOSIS — Z53.20: ICD-10-CM

## 2018-01-11 DIAGNOSIS — F41.9: ICD-10-CM

## 2018-01-11 DIAGNOSIS — S00.81XA: ICD-10-CM
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
## 2018-01-11 DIAGNOSIS — S40.012A: ICD-10-CM

## 2018-01-11 PROCEDURE — 72125 CT NECK SPINE W/O DYE: CPT

## 2018-01-11 PROCEDURE — 70450 CT HEAD/BRAIN W/O DYE: CPT

## 2018-01-11 PROCEDURE — 99284 EMERGENCY DEPT VISIT MOD MDM: CPT

## 2018-01-11 NOTE — XR
EXAMINATION TYPE: XR shoulder complete LT , 3 VIEWS

 

DATE OF EXAM ORDERED: 1/11/2018

 

HISTORY: Pain.

 

COMPARISON: None.

 

FINDINGS:  No fracture, dislocation or other acute osseous lesion is seen.

 

IMPRESSION: 

 

NORMAL LEFT SHOULDER.
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
## 2018-03-06 ENCOUNTER — HOSPITAL ENCOUNTER (EMERGENCY)
Dept: HOSPITAL 47 - EC | Age: 28
LOS: 1 days | Discharge: HOME | End: 2018-03-07
Payer: COMMERCIAL

## 2018-03-06 VITALS — RESPIRATION RATE: 18 BRPM

## 2018-03-06 DIAGNOSIS — Z79.899: ICD-10-CM

## 2018-03-06 DIAGNOSIS — F43.23: Primary | ICD-10-CM

## 2018-03-06 DIAGNOSIS — Z88.0: ICD-10-CM

## 2018-03-06 PROCEDURE — 99284 EMERGENCY DEPT VISIT MOD MDM: CPT

## 2018-03-06 PROCEDURE — 82075 ASSAY OF BREATH ETHANOL: CPT

## 2018-03-07 VITALS — HEART RATE: 68 BPM | SYSTOLIC BLOOD PRESSURE: 146 MMHG | DIASTOLIC BLOOD PRESSURE: 77 MMHG | TEMPERATURE: 97 F

## 2018-03-07 NOTE — ED
Psych HPI





- General


Chief Complaint: Psychiatric Symptoms


Stated Complaint: Mental Health


Time Seen by Provider: 03/06/18 23:57


Source: patient


Mode of arrival: ambulatory





- History of Present Illness


Initial Comments: 





This patient is a 27-year-old man with history of mood disorder, he is here to 

have psychiatric evaluation.  The patient states that his relationship with his 

girlfriend ended and he made some statements to the effect that he felt like he 

did not want to live any longer.  He states that his parents interpreted this 

as being actively suicidal, which he currently denies.  He does admit to 

feeling somewhat anxious about the end of the relationship, but he does 

currently contract for safety.  He states that he is compliant with his 

psychiatric medications.


MD Complaint: suicidal ideation


-: hour(s)


Associated Psychiatric Symptoms: depression


History of same: Yes


Quality: resolved prior to arrival


Improves With: none


Worsens With: none


Context: significant life stressor


Associated Symptoms: denies other symptoms





- Related Data


 Home Medications











 Medication  Instructions  Recorded  Confirmed


 


ARIPiprazole [Abilify] 5 mg PO DAILY 01/11/18 01/11/18


 


FLUoxetine HCL [PROzac] 40 mg PO DAILY 01/11/18 01/11/18


 


busPIRone HCL [Buspar] 30 mg PO DAILY 01/11/18 01/11/18


 


lamoTRIgine [LaMICtal] 200 mg PO DAILY 01/11/18 01/11/18











 Allergies











Allergy/AdvReac Type Severity Reaction Status Date / Time


 


Penicillins Allergy  Rash/Hives Verified 03/06/18 23:55














Review of Systems


ROS Statement: 


Those systems with pertinent positive or pertinent negative responses have been 

documented in the HPI.





ROS Other: All systems not noted in ROS Statement are negative.


Constitutional: Denies: fever, chills


Respiratory: Denies: cough, dyspnea


Cardiovascular: Denies: chest pain, palpitations


Gastrointestinal: Denies: abdominal pain, vomiting, diarrhea


Neurological: Denies: headache


Psychiatric: Reports: as per HPI, anxiety, depression.  Denies: auditory 

hallucinations, visual hallucinations, homicidal thoughts, suicidal thoughts





Past Medical History


Past Medical History: No Reported History


History of Any Multi-Drug Resistant Organisms: None Reported


Additional Past Surgical History / Comment(s): right acl, right knee arthroscopy

, right meniscus tear, right knee I&D


Past Psychological History: Anxiety, Depression


Smoking Status: Never smoker


Past Alcohol Use History: Occasional


Past Drug Use History: None Reported





General Exam


Limitations: no limitations


General appearance: alert, in no apparent distress


Head exam: Present: atraumatic, normocephalic


Eye exam: Present: normal appearance.  Absent: scleral icterus, conjunctival 

injection


ENT exam: Present: normal oropharynx


Neck exam: Present: normal inspection


Respiratory exam: Present: normal lung sounds bilaterally.  Absent: respiratory 

distress, wheezes, rales, rhonchi, stridor


Cardiovascular Exam: Present: regular rate, normal rhythm, normal heart sounds.

  Absent: systolic murmur, diastolic murmur, rubs, gallop


GI/Abdominal exam: Present: soft.  Absent: distended, tenderness, guarding, 

rebound


Back exam: Present: normal inspection.  Absent: CVA tenderness (R), CVA 

tenderness (L)


Neurological exam: Present: alert, oriented X3, normal gait


Psychiatric exam: Present: normal affect, normal mood.  Absent: depressed, 

agitated, flat affect, manic, homicidal ideation, suicidal ideation


Skin exam: Present: warm, dry, intact, normal color.  Absent: rash





Course


 Vital Signs











  03/06/18





  23:52


 


Temperature 97.9 F


 


Pulse Rate 84


 


Respiratory 18





Rate 


 


Blood Pressure 125/77


 


O2 Sat by Pulse 97





Oximetry 














Disposition


Clinical Impression: 


 Adjustment reaction





Disposition: HOME SELF-CARE


Condition: Good


Instructions:  Mood Disorders (ED)


Referrals: 


Cesario Hogan MD [Primary Care Provider] - 1-2 days

## 2020-06-16 ENCOUNTER — HOSPITAL ENCOUNTER (OUTPATIENT)
Dept: HOSPITAL 47 - RADCTMAIN | Age: 30
Discharge: HOME | End: 2020-06-16
Attending: UROLOGY
Payer: COMMERCIAL

## 2020-06-16 DIAGNOSIS — Z88.0: ICD-10-CM

## 2020-06-16 DIAGNOSIS — K57.90: Primary | ICD-10-CM

## 2020-06-16 PROCEDURE — 74176 CT ABD & PELVIS W/O CONTRAST: CPT

## 2020-06-16 NOTE — CT
EXAMINATION TYPE: CT abdomen pelvis wo con

 

DATE OF EXAM: 6/16/2020

 

COMPARISON: 2/8/2017

 

INDICATION: Renal colic, right flank pain, dysuria

 

DLP: 1173.5 mGycm, Automated exposure control for dose reduction was used.

 

CONTRAST:  0 mL of Isovue 300. 

                        Study performed without Oral Contrast

 

TECHNIQUE: Axial images were obtained from above the diaphragm to the pubic rami in the axial plane a
t 5 mm thick sections.  Reconstructed images are reviewed on the computer in the coronal plane.  

 

FINDINGS:

 

Limited CT sections are obtained the lung bases.  The lung bases are clear.

 

CT ABDOMEN:

 

Liver: Normal

 

Spleen: Normal

 

Pancreas: Normal

 

Adrenal glands: The adrenal glands are normal.

 

Gallbladder: Normal  

 

Kidneys: No masses are evident. No hydronephrosis is present.   No cysts are present.  Delayed images
 were obtained through the kidneys, which remain unremarkable.

 

Aorta: Vascular calcification is within the aorta. 

 

Inferior vena cava: Normal.

 

CT PELVIS: 

Scattered diverticuli within the colon. No acute diverticulitis is evident. Study is without oral con
trast limiting bowel evaluation.

 

Appendix: Normal as visualized.

 

Urinary bladder: Decompressed limiting evaluation. 

 

Genitourinary structures: Prostate is normal.

 

Osseous structures: No suspicious lytic or sclerotic lesions. Spondylolysis of L5 is evident. Spina b
ifida occulta of L5 is also present.

 

IMPRESSIONS:

1.  Diverticulosis without acute diverticulitis.

2. No suspicious renal stones. No hydronephrosis or hydroureter is evident.

## 2020-10-16 ENCOUNTER — HOSPITAL ENCOUNTER (OUTPATIENT)
Dept: HOSPITAL 47 - RADXRMAIN | Age: 30
Discharge: HOME | End: 2020-10-16
Attending: FAMILY MEDICINE
Payer: COMMERCIAL

## 2020-10-16 DIAGNOSIS — M94.0: Primary | ICD-10-CM

## 2020-10-16 PROCEDURE — 71046 X-RAY EXAM CHEST 2 VIEWS: CPT

## 2020-10-16 NOTE — XR
EXAMINATION TYPE: XR ribs LT

 

DATE OF EXAM: 10/16/2020

 

COMPARISON: 10/16/2020

 

HISTORY: Recent renal stent removal pain on left side

 

TECHNIQUE: Left ribs are examined in 2 projections

 

FINDINGS: No acute displaced fractures evident. No joint effusions are evident.

 

Follow-up exams can be performed 7-10 days from acute trauma for continued pain.

 

IMPRESSION:

1.  Normal two-view left RIBS

## 2020-10-16 NOTE — XR
EXAMINATION TYPE: XR chest 2V

 

DATE OF EXAM: 10/16/2020

 

COMPARISON: None

 

INDICATION: Costochondral junction syndrome

 

TECHNIQUE:  Frontal and lateral views of the chest are obtained.

 

FINDINGS:  

The heart size is normal.  

The pulmonary vasculature is normal.

The lungs are clear.  No pneumothorax is evident. No displaced rib fractures are evident.

 

IMPRESSION:  

1. No acute pulmonary process.

## 2022-05-03 ENCOUNTER — HOSPITAL ENCOUNTER (OUTPATIENT)
Dept: HOSPITAL 47 - RADCTMAIN | Age: 32
Discharge: HOME | End: 2022-05-03
Payer: COMMERCIAL

## 2022-05-03 DIAGNOSIS — R10.9: Primary | ICD-10-CM

## 2022-05-03 DIAGNOSIS — M43.17: ICD-10-CM

## 2022-05-03 DIAGNOSIS — Z87.442: ICD-10-CM

## 2022-05-03 DIAGNOSIS — R31.9: ICD-10-CM

## 2022-05-03 PROCEDURE — 74176 CT ABD & PELVIS W/O CONTRAST: CPT

## 2022-05-03 NOTE — CT
EXAMINATION TYPE: CT abdomen pelvis wo con

 

DATE OF EXAM: 5/3/2022

 

COMPARISON: 6/16/2020

 

HISTORY: h/o renal stone, flank pain and hematuria

 

CT DLP: 893.2 mGycm

Automated exposure control for dose reduction was used.

 

TECHNIQUE:  Helical acquisition of images was performed from the lung bases through the pelvis.

 

FINDINGS: 

 

LUNG BASES: No significant abnormality is appreciated.

 

LIVER/GB: No significant abnormality is appreciated.

 

PANCREAS: No significant abnormality is seen.

 

SPLEEN: No significant abnormality is seen.

 

ADRENALS: No significant abnormality is seen.

 

KIDNEYS: No significant abnormality is seen.

 

ADENOPATHY:  None visualized.

 

OSSEOUS STRUCTURES:  Bilateral spondylolysis L5 with grade 1 anterolisthesis.

 

BOWEL:  No significant abnormality is seen.

 

OTHER: Aorta of normal caliber. Tiny fat-containing periumbilical hernia.

 

IMPRESSION: 

NO HYDRONEPHROSIS OR NEPHROLITHIASIS. BILATERAL SPONDYLOLYSIS WITH GRADE 1 ANTEROLISTHESIS L5 ON S1.